# Patient Record
(demographics unavailable — no encounter records)

---

## 2025-06-24 NOTE — DISCUSSION/SUMMARY
[de-identified] : The patient has chronic lumbar spine sprain and thoracic spine sprain.  I have discussed the pathology, natural history and treatment options with her.  She is referred for physical therapy.  She will take ibuprofen for pain.  She will be reevaluated in 6 weeks.

## 2025-06-24 NOTE — PHYSICAL EXAM
[DP] : dorsalis pedis 2+ and symmetric bilaterally [PT] : posterior tibial 2+ and symmetric bilaterally [Rad] : radial 2+ and symmetric bilaterally [Normal] : Alert and in no acute distress [Poor Appearance] : well-appearing [Acute Distress] : not in acute distress [Obese] : not obese [de-identified] : The patient has no respiratory distress. Mood and affect are normal. The patient is alert and oriented to person, place and time. Examination of the cervical spine demonstrates no tenderness, no deformity and no muscle spasm. Cervical spine rotation is 60 to the right, 60 to the left, 75 of extension and 45 of flexion. Neurologic exam of the upper extremities reveals intact sensation to light touch. Motor function is 5 over 5 in all groups. Deep tendon reflexes are 2+ and equal at the biceps, triceps and brachioradialis. She has tenderness in the thoracic spine area to the left of the midline. Examination of the lumbar spine demonstrates tenderness to the left of the midline. There is mild muscle spasm. There is no deformity. Lumbar flexion is 90, right lateral flexion 10 and left lateral flexion 10. Straight leg raise test is negative. Lower extremity neurologic exam is intact with regard to sensation, motor function and deep tendon reflexes. [de-identified] : AP lateral x-rays of the thoracic spine demonstrate no fracture, no dislocation and no bony abnormality.  AP and lateral x-rays of the lumbar spine demonstrate no fracture, no dislocation and no bony abnormality.

## 2025-06-24 NOTE — HISTORY OF PRESENT ILLNESS
[de-identified] : 42-year-old female presents to the office today for chronic intermittent mid and lower back pain.  Denies recent trauma or injury.  She reports prior history of mid and low back pain worse last year.  She reports visiting the emergency room last year and was prescribed cyclobenzaprine which she never took.  She reports worsening pain over the last few days with difficulty bending, sitting, lying down, and walking.  She has not tried any modalities for the pain.  She denies numbness or tingling or radiation of pain to the lower extremities.

## 2025-06-24 NOTE — HISTORY OF PRESENT ILLNESS
[de-identified] : 42-year-old female presents to the office today for chronic intermittent mid and lower back pain.  Denies recent trauma or injury.  She reports prior history of mid and low back pain worse last year.  She reports visiting the emergency room last year and was prescribed cyclobenzaprine which she never took.  She reports worsening pain over the last few days with difficulty bending, sitting, lying down, and walking.  She has not tried any modalities for the pain.  She denies numbness or tingling or radiation of pain to the lower extremities.

## 2025-06-24 NOTE — PHYSICAL EXAM
[DP] : dorsalis pedis 2+ and symmetric bilaterally [PT] : posterior tibial 2+ and symmetric bilaterally [Rad] : radial 2+ and symmetric bilaterally [Normal] : Alert and in no acute distress [Poor Appearance] : well-appearing [Acute Distress] : not in acute distress [Obese] : not obese [de-identified] : The patient has no respiratory distress. Mood and affect are normal. The patient is alert and oriented to person, place and time. Examination of the cervical spine demonstrates no tenderness, no deformity and no muscle spasm. Cervical spine rotation is 60 to the right, 60 to the left, 75 of extension and 45 of flexion. Neurologic exam of the upper extremities reveals intact sensation to light touch. Motor function is 5 over 5 in all groups. Deep tendon reflexes are 2+ and equal at the biceps, triceps and brachioradialis. She has tenderness in the thoracic spine area to the left of the midline. Examination of the lumbar spine demonstrates tenderness to the left of the midline. There is mild muscle spasm. There is no deformity. Lumbar flexion is 90, right lateral flexion 10 and left lateral flexion 10. Straight leg raise test is negative. Lower extremity neurologic exam is intact with regard to sensation, motor function and deep tendon reflexes. [de-identified] : AP lateral x-rays of the thoracic spine demonstrate no fracture, no dislocation and no bony abnormality.  AP and lateral x-rays of the lumbar spine demonstrate no fracture, no dislocation and no bony abnormality.

## 2025-06-24 NOTE — DISCUSSION/SUMMARY
[de-identified] : The patient has chronic lumbar spine sprain and thoracic spine sprain.  I have discussed the pathology, natural history and treatment options with her.  She is referred for physical therapy.  She will take ibuprofen for pain.  She will be reevaluated in 6 weeks.